# Patient Record
Sex: MALE | Race: BLACK OR AFRICAN AMERICAN | URBAN - METROPOLITAN AREA
[De-identification: names, ages, dates, MRNs, and addresses within clinical notes are randomized per-mention and may not be internally consistent; named-entity substitution may affect disease eponyms.]

---

## 2018-10-25 ENCOUNTER — OFFICE VISIT (OUTPATIENT)
Dept: INTERNAL MEDICINE CLINIC | Age: 33
End: 2018-10-25

## 2018-10-25 ENCOUNTER — HOSPITAL ENCOUNTER (OUTPATIENT)
Dept: LAB | Age: 33
Discharge: HOME | End: 2018-10-25
Payer: COMMERCIAL

## 2018-10-25 VITALS
SYSTOLIC BLOOD PRESSURE: 130 MMHG | BODY MASS INDEX: 21.94 KG/M2 | OXYGEN SATURATION: 100 % | DIASTOLIC BLOOD PRESSURE: 77 MMHG | RESPIRATION RATE: 16 BRPM | HEART RATE: 69 BPM | TEMPERATURE: 97.8 F | WEIGHT: 171 LBS | HEIGHT: 74 IN

## 2018-10-25 DIAGNOSIS — Z13.220 SCREENING FOR CHOLESTEROL LEVEL: ICD-10-CM

## 2018-10-25 DIAGNOSIS — Z00.00 ROUTINE GENERAL MEDICAL EXAMINATION AT A HEALTH CARE FACILITY: Primary | ICD-10-CM

## 2018-10-25 DIAGNOSIS — Z00.00 ROUTINE GENERAL MEDICAL EXAMINATION AT A HEALTH CARE FACILITY: ICD-10-CM

## 2018-10-25 PROCEDURE — 80061 LIPID PANEL: CPT | Performed by: NURSE PRACTITIONER

## 2018-10-25 PROCEDURE — 85025 COMPLETE CBC W/AUTO DIFF WBC: CPT | Performed by: NURSE PRACTITIONER

## 2018-10-25 PROCEDURE — 80053 COMPREHEN METABOLIC PANEL: CPT | Performed by: NURSE PRACTITIONER

## 2018-10-25 PROCEDURE — 36415 COLL VENOUS BLD VENIPUNCTURE: CPT | Performed by: NURSE PRACTITIONER

## 2018-10-25 PROCEDURE — 81001 URINALYSIS AUTO W/SCOPE: CPT | Performed by: NURSE PRACTITIONER

## 2018-10-25 NOTE — PROGRESS NOTES
HISTORY OF PRESENT ILLNESS  Yani Coffman is a 29 y.o. male. Patient presents for a CPE. States his job offers a 20% discount for a CPE. He does not have a PCP, denies any medication condition or medication history. He denies any complaints this morning. Patient denies any HA,blurry vision,NVD,abd pain or MSK pain,dizziness,palpitations, malaise,fatigue,confusion,SOB,CP. Complete Physical   The history is provided by the patient. Review of Systems   Constitutional: Negative. HENT: Negative. Eyes: Negative. Respiratory: Negative. Cardiovascular: Negative. Gastrointestinal: Negative. Genitourinary: Negative. Musculoskeletal: Negative. Skin: Negative. Neurological: Negative. Psychiatric/Behavioral: Negative. Physical Exam   Constitutional: He is oriented to person, place, and time. He appears well-developed. HENT:   Head: Normocephalic. Eyes: Pupils are equal, round, and reactive to light. Neck: Normal range of motion. Cardiovascular: Normal rate. Pulmonary/Chest: Effort normal and breath sounds normal.   Abdominal: Soft. Bowel sounds are normal.   Neurological: He is alert and oriented to person, place, and time. GCS eye subscore is 4. GCS verbal subscore is 5. GCS motor subscore is 6. Skin: Skin is warm. Psychiatric: He has a normal mood and affect. His speech is normal and behavior is normal. Judgment and thought content normal. Cognition and memory are normal.       ASSESSMENT and PLAN    ICD-10-CM ICD-9-CM    1. Routine general medical examination at a health care facility Z00.00 V70.0 CBC WITH AUTOMATED DIFF      URINALYSIS W/MICROSCOPIC      METABOLIC PANEL, COMPREHENSIVE   2. Screening for cholesterol level Z13.220 V77.91 LIPID PANEL     Encounter Diagnoses   Name Primary?     Routine general medical examination at a health care facility Yes    Screening for cholesterol level      Orders Placed This Encounter    CBC WITH AUTOMATED DIFF    URINALYSIS W/MICROSCOPIC    METABOLIC PANEL, COMPREHENSIVE    LIPID PANEL     Orders Placed This Encounter    CBC WITH AUTOMATED DIFF     Standing Status:   Future     Standing Expiration Date:   10/26/2019    URINALYSIS W/MICROSCOPIC     Standing Status:   Future     Standing Expiration Date:   08/24/2101    METABOLIC PANEL, COMPREHENSIVE     Standing Status:   Future     Standing Expiration Date:   10/26/2019    LIPID PANEL     Standing Status:   Future     Standing Expiration Date:   10/26/2019     Orders Placed This Encounter    CBC WITH AUTOMATED DIFF    URINALYSIS W/MICROSCOPIC    METABOLIC PANEL, COMPREHENSIVE    LIPID PANEL     Diagnoses and all orders for this visit:    1. Routine general medical examination at a health care facility  -     CBC WITH AUTOMATED DIFF; Future  -     URINALYSIS W/MICROSCOPIC; Future  -     METABOLIC PANEL, COMPREHENSIVE; Future    2. Screening for cholesterol level  -     LIPID PANEL; Future      Follow-up Disposition:  Return if symptoms worsen or fail to improve.   current treatment plan is effective, no change in therapy

## 2018-10-25 NOTE — PATIENT INSTRUCTIONS
Well Visit, Ages 25 to 48: Care Instructions  Your Care Instructions    Physical exams can help you stay healthy. Your doctor has checked your overall health and may have suggested ways to take good care of yourself. He or she also may have recommended tests. At home, you can help prevent illness with healthy eating, regular exercise, and other steps. Follow-up care is a key part of your treatment and safety. Be sure to make and go to all appointments, and call your doctor if you are having problems. It's also a good idea to know your test results and keep a list of the medicines you take. How can you care for yourself at home? · Reach and stay at a healthy weight. This will lower your risk for many problems, such as obesity, diabetes, heart disease, and high blood pressure. · Get at least 30 minutes of physical activity on most days of the week. Walking is a good choice. You also may want to do other activities, such as running, swimming, cycling, or playing tennis or team sports. Discuss any changes in your exercise program with your doctor. · Do not smoke or allow others to smoke around you. If you need help quitting, talk to your doctor about stop-smoking programs and medicines. These can increase your chances of quitting for good. · Talk to your doctor about whether you have any risk factors for sexually transmitted infections (STIs). Having one sex partner (who does not have STIs and does not have sex with anyone else) is a good way to avoid these infections. · Use birth control if you do not want to have children at this time. Talk with your doctor about the choices available and what might be best for you. · Protect your skin from too much sun. When you're outdoors from 10 a.m. to 4 p.m., stay in the shade or cover up with clothing and a hat with a wide brim. Wear sunglasses that block UV rays. Even when it's cloudy, put broad-spectrum sunscreen (SPF 30 or higher) on any exposed skin.   · See a dentist one or two times a year for checkups and to have your teeth cleaned. · Wear a seat belt in the car. · Drink alcohol in moderation, if at all. That means no more than 2 drinks a day for men and 1 drink a day for women. Follow your doctor's advice about when to have certain tests. These tests can spot problems early. For everyone  · Cholesterol. Have the fat (cholesterol) in your blood tested after age 21. Your doctor will tell you how often to have this done based on your age, family history, or other things that can increase your risk for heart disease. · Blood pressure. Have your blood pressure checked during a routine doctor visit. Your doctor will tell you how often to check your blood pressure based on your age, your blood pressure results, and other factors. · Vision. Talk with your doctor about how often to have a glaucoma test.  · Diabetes. Ask your doctor whether you should have tests for diabetes. · Colon cancer. Have a test for colon cancer at age 48. You may have one of several tests. If you are younger than 48, you may need a test earlier if you have any risk factors. Risk factors include whether you already had a precancerous polyp removed from your colon or whether your parent, brother, sister, or child has had colon cancer. For women  · Breast exam and mammogram. Talk to your doctor about when you should have a clinical breast exam and a mammogram. Medical experts differ on whether and how often women under 50 should have these tests. Your doctor can help you decide what is right for you. · Pap test and pelvic exam. Begin Pap tests at age 24. A Pap test is the best way to find cervical cancer. The test often is part of a pelvic exam. Ask how often to have this test.  · Tests for sexually transmitted infections (STIs). Ask whether you should have tests for STIs. You may be at risk if you have sex with more than one person, especially if your partners do not wear condoms.   For men  · Tests for sexually transmitted infections (STIs). Ask whether you should have tests for STIs. You may be at risk if you have sex with more than one person, especially if you do not wear a condom. · Testicular cancer exam. Ask your doctor whether you should check your testicles regularly. · Prostate exam. Talk to your doctor about whether you should have a blood test (called a PSA test) for prostate cancer. Experts differ on whether and when men should have this test. Some experts suggest it if you are older than 39 and are -American or have a father or brother who got prostate cancer when he was younger than 72. When should you call for help? Watch closely for changes in your health, and be sure to contact your doctor if you have any problems or symptoms that concern you. Where can you learn more? Go to http://darron-ludy.info/. Enter P072 in the search box to learn more about \"Well Visit, Ages 25 to 48: Care Instructions. \"  Current as of: March 29, 2018  Content Version: 11.8  © 9795-3001 [employer]. Care instructions adapted under license by [employer] (which disclaims liability or warranty for this information). If you have questions about a medical condition or this instruction, always ask your healthcare professional. [place] [address] any warranty or liability for your use of this information. Eating Healthy Foods: Care Instructions  Your Care Instructions    Eating healthy foods can help lower your risk for disease. Healthy food gives you energy and keeps your heart strong, your brain active, your muscles working, and your bones strong. A healthy diet includes a variety of foods from the basic food groups: grains, vegetables, fruits, milk and milk products, and meat and beans. Some people may eat more of their favorite foods from only one food group and, as a result, miss getting the nutrients they need.  So, it is important to pay attention not only to what you eat but also to what you are missing from your diet. You can eat a healthy, balanced diet by making a few small changes. Follow-up care is a key part of your treatment and safety. Be sure to make and go to all appointments, and call your doctor if you are having problems. It's also a good idea to know your test results and keep a list of the medicines you take. How can you care for yourself at home? Look at what you eat  · Keep a food diary for a week or two and record everything you eat or drink. Track the number of servings you eat from each food group. · For a balanced diet every day, eat a variety of:  ? 6 or more ounce-equivalents of grains, such as cereals, breads, crackers, rice, or pasta, every day. An ounce-equivalent is 1 slice of bread, 1 cup of ready-to-eat cereal, or ½ cup of cooked rice, cooked pasta, or cooked cereal.  ? 2½ cups of vegetables, especially:  § Dark-green vegetables such as broccoli and spinach. § Orange vegetables such as carrots and sweet potatoes. § Dry beans (such as clement and kidney beans) and peas (such as lentils). ? 2 cups of fresh, frozen, or canned fruit. A small apple or 1 banana or orange equals 1 cup. ? 3 cups of nonfat or low-fat milk, yogurt, or other milk products. ? 5½ ounces of meat and beans, such as chicken, fish, lean meat, beans, nuts, and seeds. One egg, 1 tablespoon of peanut butter, ½ ounce nuts or seeds, or ¼ cup of cooked beans equals 1 ounce of meat. · Learn how to read food labels for serving sizes and ingredients. Fast-food and convenience-food meals often contain few or no fruits or vegetables. Make sure you eat some fruits and vegetables to make the meal more nutritious. · Look at your food diary. For each food group, add up what you have eaten and then divide the total by the number of days. This will give you an idea of how much you are eating from each food group.  See if you can find some ways to change your diet to make it more healthy. Start small  · Do not try to make dramatic changes to your diet all at once. You might feel that you are missing out on your favorite foods and then be more likely to fail. · Start slowly, and gradually change your habits. Try some of the following:  ? Use whole wheat bread instead of white bread. ? Use nonfat or low-fat milk instead of whole milk. ? Eat brown rice instead of white rice, and eat whole wheat pasta instead of white-flour pasta. ? Try low-fat cheeses and low-fat yogurt. ? Add more fruits and vegetables to meals and have them for snacks. ? Add lettuce, tomato, cucumber, and onion to sandwiches. ? Add fruit to yogurt and cereal.  Enjoy food  · You can still eat your favorite foods. You just may need to eat less of them. If your favorite foods are high in fat, salt, and sugar, limit how often you eat them, but do not cut them out entirely. · Eat a wide variety of foods. Make healthy choices when eating out  · The type of restaurant you choose can help you make healthy choices. Even fast-food chains are now offering more low-fat or healthier choices on the menu. · Choose smaller portions, or take half of your meal home. · When eating out, try:  ? A veggie pizza with a whole wheat crust or grilled chicken (instead of sausage or pepperoni). ? Pasta with roasted vegetables, grilled chicken, or marinara sauce instead of cream sauce. ? A vegetable wrap or grilled chicken wrap. ? Broiled or poached food instead of fried or breaded items. Make healthy choices easy  · Buy packaged, prewashed, ready-to-eat fresh vegetables and fruits, such as baby carrots, salad mixes, and chopped or shredded broccoli and cauliflower. · Buy packaged, presliced fruits, such as melon or pineapple. · Choose 100% fruit or vegetable juice instead of soda. Limit juice intake to 4 to 6 oz (½ to ¾ cup) a day.   · Blend low-fat yogurt, fruit juice, and canned or frozen fruit to make a smoothie for breakfast or a snack. Where can you learn more? Go to http://darron-ludy.info/. Enter T756 in the search box to learn more about \"Eating Healthy Foods: Care Instructions. \"  Current as of: March 29, 2018  Content Version: 11.8  © 3293-5252 [employer], [employer]. Care instructions adapted under license by [employer] (which disclaims liability or warranty for this information). If you have questions about a medical condition or this instruction, always ask your healthcare professional. [address] any warranty or liability for your use of this information.

## 2018-10-25 NOTE — PROGRESS NOTES
Patient presents for a CPE. States his job offers a 20% discount for a CPE. He does not have a PCP, denies any medication condition or medication history. He denies any complaints this morning. Patient denies any HA,blurry vision,NVD,abd pain or MSK pain,dizziness,palpitations, malaise,fatigue,confusion,SOB,CP.

## 2018-10-25 NOTE — PROGRESS NOTES
ROOM # 1    Boo John presents today for   Chief Complaint   Patient presents with    Complete Physical       Boo John preferred language for health care discussion is english/other. Is someone accompanying this pt? No    Is the patient using any DME equipment during OV? No    Depression Screening:  PHQ over the last two weeks 10/25/2018   Little interest or pleasure in doing things Not at all   Feeling down, depressed, irritable, or hopeless Not at all   Total Score PHQ 2 0       Learning Assessment:  No flowsheet data found. Abuse Screening:  Abuse Screening Questionnaire 10/25/2018   Do you ever feel afraid of your partner? N   Are you in a relationship with someone who physically or mentally threatens you? N   Is it safe for you to go home? Y       Fall Risk  No flowsheet data found. Health Maintenance reviewed and discussed per provider. Yes    Boo John is due for   Health Maintenance Due   Topic Date Due    DTaP/Tdap/Td series (1 - Tdap) 07/07/2007    Influenza Age 5 to Adult  08/01/2018     Please order/place referral if appropriate. Advance Directive:  1. Do you have an advance directive in place? Patient Reply: No    2. If not, would you like material regarding how to put one in place? Patient Reply: No    Coordination of Care:  1. Have you been to the ER, urgent care clinic since your last visit? Hospitalized since your last visit? No    2. Have you seen or consulted any other health care providers outside of the [address] [place] [address] since your last visit? Include any colon screening.  No

## 2018-10-26 LAB
ALBUMIN SERPL-MCNC: 4.2 G/DL (ref 3.4–5)
ALBUMIN/GLOB SERPL: 1.3 {RATIO} (ref 0.8–1.7)
ALP SERPL-CCNC: 79 U/L (ref 45–117)
ALT SERPL-CCNC: 24 U/L (ref 16–61)
ANION GAP SERPL CALC-SCNC: 7 MMOL/L (ref 3–18)
APPEARANCE UR: CLEAR
AST SERPL-CCNC: 21 U/L (ref 15–37)
BACTERIA URNS QL MICRO: NEGATIVE /HPF
BASOPHILS # BLD: 0 K/UL (ref 0–0.1)
BASOPHILS NFR BLD: 0 % (ref 0–2)
BILIRUB SERPL-MCNC: 1 MG/DL (ref 0.2–1)
BILIRUB UR QL: NEGATIVE
BUN SERPL-MCNC: 12 MG/DL (ref 7–18)
BUN/CREAT SERPL: 10 (ref 12–20)
CALCIUM SERPL-MCNC: 8.6 MG/DL (ref 8.5–10.1)
CHLORIDE SERPL-SCNC: 106 MMOL/L (ref 100–108)
CHOLEST SERPL-MCNC: 113 MG/DL
CO2 SERPL-SCNC: 29 MMOL/L (ref 21–32)
COLOR UR: YELLOW
CREAT SERPL-MCNC: 1.22 MG/DL (ref 0.6–1.3)
DIFFERENTIAL METHOD BLD: NORMAL
EOSINOPHIL # BLD: 0.2 K/UL (ref 0–0.4)
EOSINOPHIL NFR BLD: 5 % (ref 0–5)
EPITH CASTS URNS QL MICRO: NORMAL /LPF (ref 0–5)
ERYTHROCYTE [DISTWIDTH] IN BLOOD BY AUTOMATED COUNT: 12.6 % (ref 11.6–14.5)
GLOBULIN SER CALC-MCNC: 3.2 G/DL (ref 2–4)
GLUCOSE SERPL-MCNC: 95 MG/DL (ref 74–99)
GLUCOSE UR STRIP.AUTO-MCNC: NEGATIVE MG/DL
HCT VFR BLD AUTO: 44.3 % (ref 36–48)
HDLC SERPL-MCNC: 49 MG/DL (ref 40–60)
HDLC SERPL: 2.3 {RATIO} (ref 0–5)
HGB BLD-MCNC: 15.9 G/DL (ref 13–16)
HGB UR QL STRIP: NEGATIVE
KETONES UR QL STRIP.AUTO: NEGATIVE MG/DL
LDLC SERPL CALC-MCNC: 49.4 MG/DL (ref 0–100)
LEUKOCYTE ESTERASE UR QL STRIP.AUTO: NEGATIVE
LIPID PROFILE,FLP: NORMAL
LYMPHOCYTES # BLD: 1.7 K/UL (ref 0.9–3.6)
LYMPHOCYTES NFR BLD: 37 % (ref 21–52)
MCH RBC QN AUTO: 31.5 PG (ref 24–34)
MCHC RBC AUTO-ENTMCNC: 35.9 G/DL (ref 31–37)
MCV RBC AUTO: 87.7 FL (ref 74–97)
MONOCYTES # BLD: 0.5 K/UL (ref 0.05–1.2)
MONOCYTES NFR BLD: 10 % (ref 3–10)
NEUTS SEG # BLD: 2.3 K/UL (ref 1.8–8)
NEUTS SEG NFR BLD: 48 % (ref 40–73)
NITRITE UR QL STRIP.AUTO: NEGATIVE
PH UR STRIP: 7 [PH] (ref 5–8)
PLATELET # BLD AUTO: 172 K/UL (ref 135–420)
PMV BLD AUTO: 10.7 FL (ref 9.2–11.8)
POTASSIUM SERPL-SCNC: 3.8 MMOL/L (ref 3.5–5.5)
PROT SERPL-MCNC: 7.4 G/DL (ref 6.4–8.2)
PROT UR STRIP-MCNC: NEGATIVE MG/DL
RBC # BLD AUTO: 5.05 M/UL (ref 4.7–5.5)
RBC #/AREA URNS HPF: NORMAL /HPF (ref 0–5)
SODIUM SERPL-SCNC: 142 MMOL/L (ref 136–145)
SP GR UR REFRACTOMETRY: 1.01 (ref 1–1.03)
TRIGL SERPL-MCNC: 73 MG/DL (ref ?–150)
UROBILINOGEN UR QL STRIP.AUTO: 1 EU/DL (ref 0.2–1)
VLDLC SERPL CALC-MCNC: 14.6 MG/DL
WBC # BLD AUTO: 4.7 K/UL (ref 4.6–13.2)
WBC URNS QL MICRO: NORMAL /HPF (ref 0–4)

## 2019-02-12 ENCOUNTER — OFFICE VISIT (OUTPATIENT)
Dept: INTERNAL MEDICINE CLINIC | Age: 34
End: 2019-02-12

## 2019-02-12 VITALS
TEMPERATURE: 96.7 F | RESPIRATION RATE: 18 BRPM | HEART RATE: 83 BPM | OXYGEN SATURATION: 100 % | WEIGHT: 175 LBS | SYSTOLIC BLOOD PRESSURE: 128 MMHG | DIASTOLIC BLOOD PRESSURE: 91 MMHG | BODY MASS INDEX: 22.46 KG/M2 | HEIGHT: 74 IN

## 2019-02-12 DIAGNOSIS — H10.31 ACUTE CONJUNCTIVITIS OF RIGHT EYE, UNSPECIFIED ACUTE CONJUNCTIVITIS TYPE: ICD-10-CM

## 2019-02-12 DIAGNOSIS — R05.9 COUGH: Primary | ICD-10-CM

## 2019-02-12 DIAGNOSIS — R09.81 NASAL CONGESTION: ICD-10-CM

## 2019-02-12 RX ORDER — BENZONATATE 100 MG/1
100 CAPSULE ORAL
Qty: 21 CAP | Refills: 0 | Status: SHIPPED | OUTPATIENT
Start: 2019-02-12 | End: 2019-02-19

## 2019-02-12 RX ORDER — FLUTICASONE PROPIONATE 50 MCG
1 SPRAY, SUSPENSION (ML) NASAL DAILY
Qty: 1 BOTTLE | Refills: 0 | Status: SHIPPED | OUTPATIENT
Start: 2019-02-12 | End: 2019-02-22

## 2019-02-12 RX ORDER — NEOMYCIN SULFATE, POLYMYXIN B SULFATE AND DEXAMETHASONE 3.5; 10000; 1 MG/ML; [USP'U]/ML; MG/ML
1 SUSPENSION/ DROPS OPHTHALMIC 2 TIMES DAILY
Qty: 5 ML | Refills: 0 | Status: SHIPPED | OUTPATIENT
Start: 2019-02-12 | End: 2019-02-17

## 2019-02-12 NOTE — PROGRESS NOTES
HISTORY OF PRESENT ILLNESS  Michael Sebastian is a 29 y.o. male. Cold Symptoms   The history is provided by the patient. This is a new problem. The current episode started more than 2 days ago. The problem occurs constantly. The problem has not changed since onset. The cough is non-productive. There has been no fever. Associated symptoms include eye redness and rhinorrhea. Pertinent negatives include no chest pain, no chills, no sweats, no weight loss, no ear congestion, no ear pain, no headaches, no sore throat, no myalgias, no shortness of breath, no wheezing, no nausea, no vomiting and no confusion. He has tried nothing for the symptoms. The treatment provided no relief. He is not a smoker. His past medical history does not include bronchitis, pneumonia or asthma. Review of Systems   Constitutional: Negative for chills, fever, malaise/fatigue and weight loss. HENT: Positive for congestion and rhinorrhea. Negative for ear pain, sinus pain and sore throat. Eyes: Positive for discharge and redness. Negative for blurred vision, double vision, photophobia and pain. Respiratory: Positive for cough. Negative for sputum production, shortness of breath and wheezing. Cardiovascular: Negative for chest pain and palpitations. Gastrointestinal: Negative for heartburn, nausea and vomiting. Musculoskeletal: Negative for myalgias. Neurological: Negative for dizziness and headaches. Psychiatric/Behavioral: Negative for confusion. The patient is not nervous/anxious. Physical Exam   Constitutional: He is oriented to person, place, and time. He appears well-developed and well-nourished. HENT:   Head: Normocephalic and atraumatic. Right Ear: External ear normal.   Left Ear: External ear normal.   Mouth/Throat: Oropharynx is clear and moist. No oropharyngeal exudate. Eyes: EOM are normal. Pupils are equal, round, and reactive to light. Right eye exhibits discharge and exudate.  Left eye exhibits no discharge and no exudate. Neck: Neck supple. Cardiovascular: Regular rhythm. Pulmonary/Chest: Effort normal and breath sounds normal. No respiratory distress. He has no wheezes. Lymphadenopathy:     He has no cervical adenopathy. Neurological: He is alert and oriented to person, place, and time. No cranial nerve deficit. Skin: Skin is dry. Psychiatric: He has a normal mood and affect. Nursing note and vitals reviewed. ASSESSMENT and PLAN    ICD-10-CM ICD-9-CM    1. Cough R05 786.2 benzonatate (TESSALON) 100 mg capsule   2. Acute conjunctivitis of right eye, unspecified acute conjunctivitis type H10.31 372.00 neomycin-polymyxin-dexamethasone (MAXITROL) ophthalmic suspension   3. Nasal congestion R09.81 478.19 fluticasone (FLONASE) 50 mcg/actuation nasal spray   Patient advised to take medication as prescribed. Take rest and plenty of fluids. Alternate tylenol and ibuprofen for fever. Return to clinic if condition worsens in next 72 hours.

## 2019-02-12 NOTE — PROGRESS NOTES
ROOM # 9    Tera Roa presents today for   Chief Complaint   Patient presents with    Cold Symptoms     x 3 days. right eye drainage       Tear Roa preferred language for health care discussion is english/other. Is someone accompanying this pt? no    Is the patient using any DME equipment during [address]? no    Depression Screening:  3 most recent PHQ Screens 10/25/2018   Little interest or pleasure in doing things Not at all   Feeling down, depressed, irritable, or hopeless Not at all   Total Score PHQ 2 0       Learning Assessment:  No flowsheet data found. Abuse Screening:  Abuse Screening Questionnaire 10/25/2018   Do you ever feel afraid of your partner? N   Are you in a relationship with someone who physically or mentally threatens you? N   Is it safe for you to go home? Y       Fall Risk  No flowsheet data found. Health Maintenance reviewed and discussed per provider. Yes    Tera Roa is due for   Health Maintenance Due   Topic Date Due    DTaP/Tdap/Td series (1 - Tdap) 07/07/2007    Influenza Age 5 to Adult  08/01/2018         Please order/place referral if appropriate. Advance Directive:  1. Do you have an advance directive in place? Patient Reply: no    2. If not, would you like material regarding how to put one in place? Patient Reply: no    Coordination of Care:  1. Have you been to the ER, urgent care clinic since your last visit? Hospitalized since your last visit? no    2. Have you seen or consulted any other health care providers outside of the [address] [place] [address] since your last visit? Include any pap smears or colon screening.  no

## 2019-02-12 NOTE — PATIENT INSTRUCTIONS
Pinkeye: Care Instructions  Your Care Instructions    Pinkeye is redness and swelling of the eye surface and the conjunctiva (the lining of the eyelid and the covering of the white part of the eye). Pinkeye is also called conjunctivitis. Pinkeye is often caused by infection with bacteria or a virus. Dry air, allergies, smoke, and chemicals are other common causes. Pinkeye often clears on its own in 7 to 10 days. Antibiotics only help if the pinkeye is caused by bacteria. Pinkeye caused by infection spreads easily. If an allergy or chemical is causing pinkeye, it will not go away unless you can avoid whatever is causing it. Follow-up care is a key part of your treatment and safety. Be sure to make and go to all appointments, and call your doctor if you are having problems. It's also a good idea to know your test results and keep a list of the medicines you take. How can you care for yourself at home? · Wash your hands often. Always wash them before and after you treat pinkeye or touch your eyes or face. · Use moist cotton or a clean, wet cloth to remove crust. Wipe from the inside corner of the eye to the outside. Use a clean part of the cloth for each wipe. · Put cold or warm wet cloths on your eye a few times a day if the eye hurts. · Do not wear contact lenses or eye makeup until the pinkeye is gone. Throw away any eye makeup you were using when you got pinkeye. Clean your contacts and storage case. If you wear disposable contacts, use a new pair when your eye has cleared and it is safe to wear contacts again. · If the doctor gave you antibiotic ointment or eyedrops, use them as directed. Use the medicine for as long as instructed, even if your eye starts looking better soon. Keep the bottle tip clean, and do not let it touch the eye area. · To put in eyedrops or ointment:  ? Tilt your head back, and pull your lower eyelid down with one finger. ?  Drop or squirt the medicine inside the lower lid.  ? Close your eye for 30 to 60 seconds to let the drops or ointment move around. ? Do not touch the ointment or dropper tip to your eyelashes or any other surface. · Do not share towels, pillows, or washcloths while you have pinkeye. When should you call for help? Call your doctor now or seek immediate medical care if:    · You have pain in your eye, not just irritation on the surface.     · You have a change in vision or loss of vision.     · You have an increase in discharge from the eye.     · Your eye has not started to improve or begins to get worse within 48 hours after you start using antibiotics.     · Pinkeye lasts longer than 7 days.    Watch closely for changes in your health, and be sure to contact your doctor if you have any problems. Where can you learn more? Go to http://darron-ludy.info/. Enter Y392 in the search box to learn more about \"Pinkeye: Care Instructions. \"  Current as of: September 23, 2018  Content Version: 11.9  © 3709-0026 [employer]. Care instructions adapted under license by [employer] (which disclaims liability or warranty for this information). If you have questions about a medical condition or this instruction, always ask your healthcare professional. [address] any warranty or liability for your use of this information.